# Patient Record
Sex: MALE | Race: WHITE | Employment: FULL TIME | URBAN - METROPOLITAN AREA
[De-identification: names, ages, dates, MRNs, and addresses within clinical notes are randomized per-mention and may not be internally consistent; named-entity substitution may affect disease eponyms.]

---

## 2020-10-09 ENCOUNTER — OFFICE VISIT (OUTPATIENT)
Dept: FAMILY MEDICINE CLINIC | Facility: CLINIC | Age: 30
End: 2020-10-09
Payer: COMMERCIAL

## 2020-10-09 VITALS
TEMPERATURE: 98 F | SYSTOLIC BLOOD PRESSURE: 128 MMHG | OXYGEN SATURATION: 98 % | WEIGHT: 146 LBS | HEIGHT: 70 IN | HEART RATE: 104 BPM | DIASTOLIC BLOOD PRESSURE: 86 MMHG | BODY MASS INDEX: 20.9 KG/M2

## 2020-10-09 DIAGNOSIS — J45.21 MILD INTERMITTENT ASTHMATIC BRONCHITIS WITH ACUTE EXACERBATION: ICD-10-CM

## 2020-10-09 DIAGNOSIS — Z23 NEED FOR VACCINATION: Primary | ICD-10-CM

## 2020-10-09 DIAGNOSIS — J30.89 SEASONAL ALLERGIC RHINITIS DUE TO FUNGAL SPORES: ICD-10-CM

## 2020-10-09 PROBLEM — R05.9 COUGH: Status: ACTIVE | Noted: 2020-10-09

## 2020-10-09 PROBLEM — J45.901 ASTHMATIC BRONCHITIS WITH ACUTE EXACERBATION: Status: ACTIVE | Noted: 2020-10-09

## 2020-10-09 PROCEDURE — 3079F DIAST BP 80-89 MM HG: CPT | Performed by: FAMILY MEDICINE

## 2020-10-09 PROCEDURE — 90471 IMMUNIZATION ADMIN: CPT | Performed by: FAMILY MEDICINE

## 2020-10-09 PROCEDURE — 90686 IIV4 VACC NO PRSV 0.5 ML IM: CPT | Performed by: FAMILY MEDICINE

## 2020-10-09 PROCEDURE — 3008F BODY MASS INDEX DOCD: CPT | Performed by: FAMILY MEDICINE

## 2020-10-09 PROCEDURE — 99204 OFFICE O/P NEW MOD 45 MIN: CPT | Performed by: FAMILY MEDICINE

## 2020-10-09 PROCEDURE — 3074F SYST BP LT 130 MM HG: CPT | Performed by: FAMILY MEDICINE

## 2020-10-09 RX ORDER — ALBUTEROL SULFATE 90 UG/1
2 AEROSOL, METERED RESPIRATORY (INHALATION) EVERY 4 HOURS PRN
Qty: 1 INHALER | Refills: 6 | Status: SHIPPED | OUTPATIENT
Start: 2020-10-09

## 2020-10-09 RX ORDER — CETIRIZINE HYDROCHLORIDE 10 MG/1
CAPSULE, LIQUID FILLED ORAL
COMMUNITY

## 2020-10-09 RX ORDER — FLUTICASONE PROPIONATE 50 MCG
2 SPRAY, SUSPENSION (ML) NASAL DAILY
Qty: 3 BOTTLE | Refills: 3 | Status: SHIPPED | OUTPATIENT
Start: 2020-10-09 | End: 2021-10-04

## 2020-10-09 RX ORDER — BUDESONIDE AND FORMOTEROL FUMARATE DIHYDRATE 80; 4.5 UG/1; UG/1
2 AEROSOL RESPIRATORY (INHALATION) 2 TIMES DAILY
Qty: 1 INHALER | Refills: 1 | Status: SHIPPED | OUTPATIENT
Start: 2020-10-09 | End: 2020-10-09

## 2020-10-09 RX ORDER — DEXAMETHASONE 4 MG/1
1 TABLET ORAL 2 TIMES DAILY
Qty: 3 CAN | Refills: 3 | Status: SHIPPED | OUTPATIENT
Start: 2020-10-09 | End: 2021-10-04

## 2020-10-09 NOTE — PROGRESS NOTES
HPI:    Patient ID: Abdoul Turpin is a 31year old male who presents for allergies and cough. Pt with tightness upper chest and constant urge to cough. Usually no sputem, or clear mucous. No fevers night sweats.   Positive drainage down throat, no na Negative for: rash, blister,          /86   Pulse 104   Temp 98 °F (36.7 °C) (Oral)   Ht 70\"   Wt 146 lb (66.2 kg)   SpO2 98%   BMI 20.95 kg/m²     PHYSICAL EXAM:   Physical Exam  Signs stable, afebrile, alert and oriented x4 no acute distress HEENT partially prepared using [facility] [employer] voice recognition dictation software. As a result, errors may occur. When identified, these errors have been corrected.  While every attempt is made to correct errors during dictation, discrepancies may still exist.